# Patient Record
Sex: FEMALE | Race: WHITE | Employment: OTHER
[De-identification: names, ages, dates, MRNs, and addresses within clinical notes are randomized per-mention and may not be internally consistent; named-entity substitution may affect disease eponyms.]

---

## 2020-10-12 NOTE — PATIENT DISCUSSION
Risk benefits and alternatives to systemic immunosuppressive therapy were discussed with the patient.

## 2020-10-19 NOTE — PATIENT DISCUSSION
Taper Prednisolone acetate 1% 4 times a day for 4 days, 3 for 3 days, 2 for 2 days, 1 for 1 day, then stop.

## 2021-09-14 ENCOUNTER — PREPPED CHART (OUTPATIENT)
Age: 61
End: 2021-09-14

## 2021-09-15 ENCOUNTER — ESTABLISHED COMPREHENSIVE EXAM (OUTPATIENT)
Age: 61
End: 2021-09-15

## 2021-09-15 VITALS — HEIGHT: 55 IN

## 2021-09-15 DIAGNOSIS — H52.13: ICD-10-CM

## 2021-09-15 DIAGNOSIS — H43.811: ICD-10-CM

## 2021-09-15 PROCEDURE — 92015 DETERMINE REFRACTIVE STATE: CPT

## 2021-09-15 PROCEDURE — 92310-1 LEVEL 1 CONTACT LENS MANAGEMENT

## 2021-09-15 PROCEDURE — 92014 COMPRE OPH EXAM EST PT 1/>: CPT

## 2021-09-15 ASSESSMENT — KERATOMETRY
OD_AXISANGLE_DEGREES: 171
OD_K1POWER_DIOPTERS: 45.75
OS_AXISANGLE_DEGREES: 163
OD_AXISANGLE2_DEGREES: 81
OS_K2POWER_DIOPTERS: 47.00
OD_K2POWER_DIOPTERS: 46.50
OS_AXISANGLE2_DEGREES: 73
OS_K1POWER_DIOPTERS: 46.25

## 2021-09-15 ASSESSMENT — VISUAL ACUITY
OD_CC: 20/20
OS_CC: J1
OS_CC: 20/80

## 2021-09-15 ASSESSMENT — TONOMETRY
OS_IOP_MMHG: 19
OD_IOP_MMHG: 19

## 2022-07-09 ENCOUNTER — TELEPHONE ENCOUNTER (OUTPATIENT)
Dept: URBAN - METROPOLITAN AREA CLINIC 121 | Facility: CLINIC | Age: 62
End: 2022-07-09

## 2022-07-10 ENCOUNTER — TELEPHONE ENCOUNTER (OUTPATIENT)
Dept: URBAN - METROPOLITAN AREA CLINIC 121 | Facility: CLINIC | Age: 62
End: 2022-07-10

## 2023-07-03 ENCOUNTER — COMPREHENSIVE EXAM (OUTPATIENT)
Dept: URBAN - METROPOLITAN AREA CLINIC 27 | Facility: CLINIC | Age: 63
End: 2023-07-03

## 2023-07-03 DIAGNOSIS — H43.811: ICD-10-CM

## 2023-07-03 DIAGNOSIS — H52.13: ICD-10-CM

## 2023-07-03 DIAGNOSIS — Z97.3: ICD-10-CM

## 2023-07-03 PROCEDURE — 92310-1 LEVEL 1 CONTACT LENS MANAGEMENT

## 2023-07-03 PROCEDURE — 92015 DETERMINE REFRACTIVE STATE: CPT

## 2023-07-03 PROCEDURE — 92014 COMPRE OPH EXAM EST PT 1/>: CPT

## 2023-07-03 ASSESSMENT — TONOMETRY
OS_IOP_MMHG: 20
OD_IOP_MMHG: 20

## 2023-07-03 ASSESSMENT — KERATOMETRY
OD_K2POWER_DIOPTERS: 46.50
OD_AXISANGLE_DEGREES: 171
OS_AXISANGLE2_DEGREES: 73
OD_AXISANGLE2_DEGREES: 81
OS_K1POWER_DIOPTERS: 46.25
OD_K1POWER_DIOPTERS: 45.75
OS_AXISANGLE_DEGREES: 163
OS_K2POWER_DIOPTERS: 47.00

## 2023-07-03 ASSESSMENT — VISUAL ACUITY
OS_CC: 20/20-2
OD_CC: 20/20-2